# Patient Record
Sex: FEMALE | Race: BLACK OR AFRICAN AMERICAN | NOT HISPANIC OR LATINO | Employment: UNEMPLOYED | URBAN - METROPOLITAN AREA
[De-identification: names, ages, dates, MRNs, and addresses within clinical notes are randomized per-mention and may not be internally consistent; named-entity substitution may affect disease eponyms.]

---

## 2021-06-16 PROBLEM — L03.113 RIGHT FOREARM CELLULITIS: Status: ACTIVE | Noted: 2019-08-16

## 2022-08-20 ENCOUNTER — HOSPITAL ENCOUNTER (EMERGENCY)
Facility: HOSPITAL | Age: 72
Discharge: HOME | End: 2022-08-20
Attending: EMERGENCY MEDICINE | Admitting: EMERGENCY MEDICINE
Payer: COMMERCIAL

## 2022-08-20 VITALS
OXYGEN SATURATION: 98 % | SYSTOLIC BLOOD PRESSURE: 186 MMHG | TEMPERATURE: 98.3 F | BODY MASS INDEX: 33.66 KG/M2 | HEART RATE: 89 BPM | WEIGHT: 190 LBS | RESPIRATION RATE: 20 BRPM | DIASTOLIC BLOOD PRESSURE: 94 MMHG

## 2022-08-20 DIAGNOSIS — K08.89 PAIN, DENTAL: ICD-10-CM

## 2022-08-20 PROCEDURE — 99283 EMERGENCY DEPT VISIT LOW MDM: CPT

## 2022-08-20 RX ORDER — OXYCODONE AND ACETAMINOPHEN 5; 325 MG/1; MG/1
1 TABLET ORAL ONCE
Status: DISCONTINUED | OUTPATIENT
Start: 2022-08-20 | End: 2022-08-20 | Stop reason: HOSPADM

## 2022-08-20 RX ORDER — IBUPROFEN 800 MG/1
800 TABLET, FILM COATED ORAL EVERY 8 HOURS PRN
Qty: 30 TABLET | Refills: 0 | Status: SHIPPED | OUTPATIENT
Start: 2022-08-20 | End: 2022-09-09

## 2022-08-20 RX ORDER — ACETAMINOPHEN 325 MG/1
650 TABLET ORAL EVERY 6 HOURS PRN
Qty: 30 TABLET | Refills: 0 | Status: SHIPPED | OUTPATIENT
Start: 2022-08-20

## 2022-08-20 RX ORDER — PENICILLIN V POTASSIUM 500 MG/1
500 TABLET, FILM COATED ORAL 4 TIMES DAILY
Qty: 28 TABLET | Refills: 0 | Status: SHIPPED | OUTPATIENT
Start: 2022-08-20 | End: 2022-08-27

## 2022-08-20 ASSESSMENT — ENCOUNTER SYMPTOMS
CHILLS: 0
FACIAL SWELLING: 0
HEADACHES: 0
FEVER: 0
SHORTNESS OF BREATH: 0
TROUBLE SWALLOWING: 0

## 2022-08-20 ASSESSMENT — ACTIVITIES OF DAILY LIVING (ADL)
ADLS_ACUITY_SCORE: 35
ADLS_ACUITY_SCORE: 35

## 2022-08-20 NOTE — DISCHARGE INSTRUCTIONS
You were seen and evaluated here in the ED for your dental pain. I will provide you with a prescription for penicillin, Tylenol and ibuprofen to take for your pain. I recommend calling around to find a dentist to see you sooner or go to the [facility] emergency dental clinic if pain is too severe.     Return for any new or concerning symptoms.

## 2022-08-20 NOTE — ED NOTES
Patient was to be roomed to -9 but when patient's name called patient not in lobby. ED admitting staff stated patient had left. Patient had been ambulatory while in ED and patient's destination unknown when she left.

## 2022-08-20 NOTE — ED PROVIDER NOTES
EMERGENCY DEPARTMENT ENCOUNTER      NAME: Mariana Tejeda  AGE: 72 year old female  YOB: 1950  MRN: [id number]  EVALUATION DATE & TIME: No admission date for patient encounter.    PCP: Alejandro Mancia Hensley    ED PROVIDER: Vivian Patton PA-C      Chief Complaint   Patient presents with     Dental Pain         FINAL IMPRESSION:  1. Pain, dental      MEDICAL DECISION MAKING:    Pertinent Labs & Imaging studies reviewed. (See chart for details)  72 year old female presents to the Emergency Department for evaluation of dental pain. The patient has been having intermittent left upper jaw pain radiating from tooth #9 for the past few years and for the past 2 weeks the pain has become increasingly worse so she presented to the ED. On my evaluation, she is vitally stable other than an elevated blood pressure of 186/94. Exam significant for tenderness of tooth #9/10 along the gumline without erythema, swelling or abscess. No facial swelling or erythema concerning for deep space infection.     At this time there is no area of abscess to drain and ultimately the patient needs to follow up with her dentist for treatment. She has been taking ibuprofen without any relief and we discussed that we do not prescribe narcotics for dental pain, but will give her a dose here in the ED. I also instructed the patient and her daughter to try and get into a dentist as soon as possible and if pain becomes too great she can go to the [facility] emergency dental clinic. The patient and her daughter were in agreement and understanding. I have provided her with prescriptions for Tylenol, ibuprofen and penicillin for her presumed dental infection. All questions were answered to the best of my ability and she was discharged in stable condition.     ED COURSE:  12:55 PM I met with the patient, obtained history, performed an initial exam, and discussed options and plan for diagnostics and treatment here in the ED.    1:57 PM I  staffed the patient with Dr. Allison MD.    2:30 PM Patient discharged after being provided with extensive anticipatory guidance and given return precautions, importance of PCP follow-up emphasized.    At the conclusion of the encounter I discussed the results of all of the tests and the disposition. The questions were answered. The patient or family acknowledged understanding and was agreeable with the care plan.     MEDICATIONS GIVEN IN THE EMERGENCY:  Medications   oxyCODONE-acetaminophen (PERCOCET) 5-325 MG per tablet 1 tablet (has no administration in time range)       NEW PRESCRIPTIONS STARTED AT TODAY'S ER VISIT  New Prescriptions    ACETAMINOPHEN (TYLENOL) 325 MG TABLET    Take 2 tablets (650 mg) by mouth every 6 hours as needed for mild pain    IBUPROFEN (ADVIL/MOTRIN) 800 MG TABLET    Take 1 tablet (800 mg) by mouth every 8 hours as needed for moderate pain    PENICILLIN V (VEETID) 500 MG TABLET    Take 1 tablet (500 mg) by mouth 4 times daily for 7 days            =================================================================    HPI:    Patient information was obtained from: Patient    Use of Interpretor: N/A       Mariana Tejeda is a 72 year old female with a pertinent history of T2DM, hypertension, and hyperlipidemia who presents to this ED via walk in for evaluation of dental pain.       REVIEW OF SYSTEMS:  Review of Systems   Constitutional: Negative for chills and fever.   HENT: Positive for dental problem. Negative for facial swelling and trouble swallowing.    Eyes: Negative for visual disturbance.   Respiratory: Negative for shortness of breath.    Cardiovascular: Negative for chest pain.   Neurological: Negative for headaches.   All other systems reviewed and are negative.        PAST MEDICAL HISTORY:  No past medical history on file.    PAST SURGICAL HISTORY:  No past surgical history on file.        CURRENT MEDICATIONS:      Current Facility-Administered Medications:       oxyCODONE-acetaminophen (PERCOCET) 5-325 MG per tablet 1 tablet, 1 tablet, Oral, Once, Vivian Patton PA-C    Current Outpatient Medications:      acetaminophen (TYLENOL) 325 MG tablet, Take 2 tablets (650 mg) by mouth every 6 hours as needed for mild pain, Disp: 30 tablet, Rfl: 0     ibuprofen (ADVIL/MOTRIN) 800 MG tablet, Take 1 tablet (800 mg) by mouth every 8 hours as needed for moderate pain, Disp: 30 tablet, Rfl: 0     penicillin V (VEETID) 500 MG tablet, Take 1 tablet (500 mg) by mouth 4 times daily for 7 days, Disp: 28 tablet, Rfl: 0      ALLERGIES:  Allergies   Allergen Reactions     Nuts Other (See Comments)     Sneeze, cough       FAMILY HISTORY:  No family history on file.    SOCIAL HISTORY:   Social History     Socioeconomic History     Marital status: Single   Tobacco Use     Smoking status: Never Smoker   Substance and Sexual Activity     Alcohol use: Never     Drug use: Never       VITALS:  Patient Vitals for the past 24 hrs:   BP Temp Temp src Pulse Resp SpO2 Weight   08/20/22 0936 (!) 186/94 98.3  F (36.8  C) Oral 89 20 98 % 86.2 kg (190 lb)       PHYSICAL EXAM    Constitutional: Well developed, Well nourished, NAD  HENT: Normocephalic, Atraumatic, Bilateral external ears normal. No facial swelling.   Mouth: Slight darkening/discoloration of the gums over tooth 9/10 with tenderness to palpation, no erythema, swelling or abscess. No intraoral swelling.  Neck: Normal range of motion, No tenderness, Supple, No stridor.  Eyes: Eyes track normally with exam, Conjunctiva normal, No discharge.   Respiratory: Speaks full sentences easily. No cough.  Cardiovascular: Heart rate and blood pressure as above.   Musculoskeletal: Moving all extremities without difficulty.   Integument: Warm, Dry, No erythema, No rash. No petechiae.  Neurologic: Alert & oriented. Normal gait.  Psychiatric: Affect normal, Judgment normal, Mood normal. Cooperative.    LAB:  All pertinent labs reviewed and interpreted.  No  results found for this or any previous visit (from the past 24 hour(s)).      RADIOLOGY:  Reviewed all pertinent imaging. Please see official radiology report.  No orders to display       PROCEDURES:   None      I, Radha Ibarra, am serving as a scribe to document services personally performed by Vivian Patton PA-C based on my observation and the provider's statements to me. I, Vivian Patton PA-C attest that Radha Ibarra is acting in a scribe capacity, has observed my performance of the services and has documented them in accordance with my direction.    Vivian Patton PA-C  Emergency Medicine  [facility]  8/20/2022     Vivian Patton PA-C  08/20/22 1507

## 2022-08-20 NOTE — ED TRIAGE NOTES
Patient presents here with dental pain. She locates the pain to the top row of teeth. Beginning and the front left tooth (number 9) and extends to the molars. Pain has occurred for the past two weeks. No swelling or warmth to her face.

## 2022-08-20 NOTE — ED PROVIDER NOTES
Emergency Department Midlevel Supervisory Note     I personally saw the patient and performed a substantive portion of the visit including all aspects of the medical decision making.    ED Course:  1:31 PM Vivian Patton PA-C staffed patient with me. I agree with their assessment and plan of management, and I will see the patient.  2:07 PM I met with the patient to introduce myself, gather additional history, perform my initial exam, and discuss the plan.     Brief HPI:     Mariana Tejeda is a 72 year old female who presents for evaluation of dental pain.  Patient reports subacute onset of pain in her left upper jawline.  Denies any specific trauma.  Reports previous dental procedure in that area a long time ago.  Denies any fever.  No facial swelling.  Difficulty breathing or swallowing.    I, Radha Ibarra, am serving as a scribe to document services personally performed by Duane Cooper M.D., based on my observations and the provider's statements to me.   I, Duane Cooper M.D., attest that Radha Ibarra was acting in a scribe capacity, has observed my performance of the services and has documented them in accordance with my direction.    Brief Physical Exam: BP (!) 186/94   Pulse 89   Temp 98.3  F (36.8  C) (Oral)   Resp 20   Wt 86.2 kg (190 lb)   SpO2 98%   BMI 33.66 kg/m    Constitutional:  Alert, in no acute distress  EYES: Conjunctivae clear  HENT:   Good overall dentition.  Tooth #10 is somewhat tender to percussion and she has tenderness along the gumline without any palpable fluctuance or visible swelling.  There is no facial swelling.  Floor the mouth is soft.  No trismus.  Respiratory:  Respirations even, unlabored, in no acute respiratory distress  Cardiovascular:  Regular rate and rhythm, good peripheral perfusion  GI: Soft  Musculoskeletal:  No edema. No cyanosis. Range of motion major extremities intact.    Integument: Warm, Dry, No erythema, No rash.   Neurologic:  Alert & oriented, no  focal deficits noted  Psych: Normal mood and affect     MDM:  Patient is a 72-year-old who presents with subacute concerns about left upper jaw pain.  She is hypertensive but otherwise vitally stable when she arrives.  I suspect she probably has pulpitis although no evidence of drainable fluid collections or rapidly progressive infection.  We are going to trial antibiotics.  She was given 1 dose of narcotic analgesics here in the emergency department but will need to continue Tylenol and ibuprofen on a scheduled basis as well as Orajel at home.  Agree with remainder of ED course and plan as documented by ROYER    1. Pain, dental        Labs and Imaging:     I have reviewed the relevant laboratory and radiology studies    Procedures:  I was present for the key portions of this procedure: none    Duane Cooper M.D.  Emergency Medicine  [facility] EMERGENCY DEPARTMENT  [address] [place] [id number]  [phone]  Dept: [phone]     Duane Cooper MD  08/20/22 8874

## 2023-12-27 DIAGNOSIS — Z01.00 DIABETIC EYE EXAM (H): Primary | ICD-10-CM

## 2023-12-27 DIAGNOSIS — E11.9 DIABETIC EYE EXAM (H): Primary | ICD-10-CM

## 2023-12-27 PROBLEM — E83.52 HYPERCALCEMIA: Status: ACTIVE | Noted: 2019-05-17

## 2023-12-27 PROBLEM — D12.6 ADENOMATOUS POLYP OF COLON: Status: ACTIVE | Noted: 2019-08-07

## 2024-01-04 ENCOUNTER — TELEPHONE (OUTPATIENT)
Dept: OPHTHALMOLOGY | Facility: CLINIC | Age: 74
End: 2024-01-04
Payer: COMMERCIAL